# Patient Record
Sex: MALE | Race: WHITE | NOT HISPANIC OR LATINO | Employment: FULL TIME | URBAN - METROPOLITAN AREA
[De-identification: names, ages, dates, MRNs, and addresses within clinical notes are randomized per-mention and may not be internally consistent; named-entity substitution may affect disease eponyms.]

---

## 2022-10-05 ENCOUNTER — PATIENT MESSAGE (OUTPATIENT)
Dept: FAMILY MEDICINE | Facility: CLINIC | Age: 27
End: 2022-10-05
Payer: COMMERCIAL

## 2022-10-06 ENCOUNTER — TELEPHONE (OUTPATIENT)
Dept: FAMILY MEDICINE | Facility: CLINIC | Age: 27
End: 2022-10-06
Payer: COMMERCIAL

## 2022-10-06 NOTE — TELEPHONE ENCOUNTER
----- Message from Fred Rivera sent at 10/6/2022  8:43 AM CDT -----  Contact: pt at [phone]  Type:  Same Day Appointment Request    Caller is requesting a same day appointment.  Caller declined first available appointment listed below.      Name of Caller:  pt  When is the first available appointment?  10/7/22  Symptoms: abdominal pain  Best Call Back Number: [phone]    Additional Information:   Please call back and advise.

## 2022-10-07 ENCOUNTER — HOSPITAL ENCOUNTER (OUTPATIENT)
Dept: RADIOLOGY | Facility: HOSPITAL | Age: 27
Discharge: HOME | End: 2022-10-07
Attending: STUDENT IN AN ORGANIZED HEALTH CARE EDUCATION/TRAINING PROGRAM
Payer: COMMERCIAL

## 2022-10-07 ENCOUNTER — TELEPHONE (OUTPATIENT)
Dept: FAMILY MEDICINE | Facility: CLINIC | Age: 27
End: 2022-10-07
Payer: COMMERCIAL

## 2022-10-07 ENCOUNTER — OFFICE VISIT (OUTPATIENT)
Dept: FAMILY MEDICINE | Facility: CLINIC | Age: 27
End: 2022-10-07
Payer: COMMERCIAL

## 2022-10-07 VITALS
HEART RATE: 60 BPM | WEIGHT: 176.06 LBS | SYSTOLIC BLOOD PRESSURE: 112 MMHG | DIASTOLIC BLOOD PRESSURE: 80 MMHG | BODY MASS INDEX: 26.68 KG/M2 | HEIGHT: 68 IN

## 2022-10-07 DIAGNOSIS — R10.11 CONTINUOUS RUQ ABDOMINAL PAIN: ICD-10-CM

## 2022-10-07 DIAGNOSIS — R10.11 CONTINUOUS RUQ ABDOMINAL PAIN: Primary | ICD-10-CM

## 2022-10-07 DIAGNOSIS — Z11.4 ENCOUNTER FOR SCREENING FOR HUMAN IMMUNODEFICIENCY VIRUS (HIV): ICD-10-CM

## 2022-10-07 DIAGNOSIS — Z13.220 ENCOUNTER FOR SCREENING FOR LIPID DISORDER: ICD-10-CM

## 2022-10-07 DIAGNOSIS — Z11.59 ENCOUNTER FOR HEPATITIS C SCREENING TEST FOR LOW RISK PATIENT: ICD-10-CM

## 2022-10-07 LAB
BILIRUB SERPL-MCNC: NORMAL MG/DL
BLOOD URINE, POC: NORMAL
CLARITY, POC UA: CLEAR
COLOR, POC UA: YELLOW
GLUCOSE UR QL STRIP: NORMAL
KETONES UR QL STRIP: NORMAL
LEUKOCYTE ESTERASE URINE, POC: NORMAL
NITRITE, POC UA: NORMAL
PH, POC UA: 7.5
PROTEIN, POC: NORMAL
SPECIFIC GRAVITY, POC UA: 1.01
UROBILINOGEN, POC UA: 0.2

## 2022-10-07 PROCEDURE — 3079F DIAST BP 80-89 MM HG: CPT | Mod: CPTII,S$GLB,, | Performed by: STUDENT IN AN ORGANIZED HEALTH CARE EDUCATION/TRAINING PROGRAM

## 2022-10-07 PROCEDURE — 71100 XR RIBS 2 VIEW RIGHT: ICD-10-PCS | Mod: 26,RT,, | Performed by: RADIOLOGY

## 2022-10-07 PROCEDURE — 99214 PR OFFICE/OUTPT VISIT, EST, LEVL IV, 30-39 MIN: ICD-10-PCS | Mod: S$GLB,,, | Performed by: STUDENT IN AN ORGANIZED HEALTH CARE EDUCATION/TRAINING PROGRAM

## 2022-10-07 PROCEDURE — 1160F RVW MEDS BY RX/DR IN RCRD: CPT | Mod: CPTII,S$GLB,, | Performed by: STUDENT IN AN ORGANIZED HEALTH CARE EDUCATION/TRAINING PROGRAM

## 2022-10-07 PROCEDURE — 1159F PR MEDICATION LIST DOCUMENTED IN MEDICAL RECORD: ICD-10-PCS | Mod: CPTII,S$GLB,, | Performed by: STUDENT IN AN ORGANIZED HEALTH CARE EDUCATION/TRAINING PROGRAM

## 2022-10-07 PROCEDURE — 99999 PR PBB SHADOW E&M-EST. PATIENT-LVL III: CPT | Mod: PBBFAC,,, | Performed by: STUDENT IN AN ORGANIZED HEALTH CARE EDUCATION/TRAINING PROGRAM

## 2022-10-07 PROCEDURE — 81002 POCT URINE DIPSTICK WITHOUT MICROSCOPE: ICD-10-PCS | Mod: S$GLB,,, | Performed by: STUDENT IN AN ORGANIZED HEALTH CARE EDUCATION/TRAINING PROGRAM

## 2022-10-07 PROCEDURE — 1160F PR REVIEW ALL MEDS BY PRESCRIBER/CLIN PHARMACIST DOCUMENTED: ICD-10-PCS | Mod: CPTII,S$GLB,, | Performed by: STUDENT IN AN ORGANIZED HEALTH CARE EDUCATION/TRAINING PROGRAM

## 2022-10-07 PROCEDURE — 1159F MED LIST DOCD IN RCRD: CPT | Mod: CPTII,S$GLB,, | Performed by: STUDENT IN AN ORGANIZED HEALTH CARE EDUCATION/TRAINING PROGRAM

## 2022-10-07 PROCEDURE — 3074F PR MOST RECENT SYSTOLIC BLOOD PRESSURE < 130 MM HG: ICD-10-PCS | Mod: CPTII,S$GLB,, | Performed by: STUDENT IN AN ORGANIZED HEALTH CARE EDUCATION/TRAINING PROGRAM

## 2022-10-07 PROCEDURE — 3008F BODY MASS INDEX DOCD: CPT | Mod: CPTII,S$GLB,, | Performed by: STUDENT IN AN ORGANIZED HEALTH CARE EDUCATION/TRAINING PROGRAM

## 2022-10-07 PROCEDURE — 99214 OFFICE O/P EST MOD 30 MIN: CPT | Mod: S$GLB,,, | Performed by: STUDENT IN AN ORGANIZED HEALTH CARE EDUCATION/TRAINING PROGRAM

## 2022-10-07 PROCEDURE — 3074F SYST BP LT 130 MM HG: CPT | Mod: CPTII,S$GLB,, | Performed by: STUDENT IN AN ORGANIZED HEALTH CARE EDUCATION/TRAINING PROGRAM

## 2022-10-07 PROCEDURE — 81002 URINALYSIS NONAUTO W/O SCOPE: CPT | Mod: S$GLB,,, | Performed by: STUDENT IN AN ORGANIZED HEALTH CARE EDUCATION/TRAINING PROGRAM

## 2022-10-07 PROCEDURE — 3079F PR MOST RECENT DIASTOLIC BLOOD PRESSURE 80-89 MM HG: ICD-10-PCS | Mod: CPTII,S$GLB,, | Performed by: STUDENT IN AN ORGANIZED HEALTH CARE EDUCATION/TRAINING PROGRAM

## 2022-10-07 PROCEDURE — 71100 X-RAY EXAM RIBS UNI 2 VIEWS: CPT | Mod: TC,PN,RT

## 2022-10-07 PROCEDURE — 3008F PR BODY MASS INDEX (BMI) DOCUMENTED: ICD-10-PCS | Mod: CPTII,S$GLB,, | Performed by: STUDENT IN AN ORGANIZED HEALTH CARE EDUCATION/TRAINING PROGRAM

## 2022-10-07 PROCEDURE — 99999 PR PBB SHADOW E&M-EST. PATIENT-LVL III: ICD-10-PCS | Mod: PBBFAC,,, | Performed by: STUDENT IN AN ORGANIZED HEALTH CARE EDUCATION/TRAINING PROGRAM

## 2022-10-07 PROCEDURE — 71100 X-RAY EXAM RIBS UNI 2 VIEWS: CPT | Mod: 26,RT,, | Performed by: RADIOLOGY

## 2022-10-07 RX ORDER — MELOXICAM 15 MG/1
15 TABLET ORAL DAILY
Qty: 14 TABLET | Refills: 0 | Status: SHIPPED | OUTPATIENT
Start: 2022-10-07 | End: 2022-10-21

## 2022-10-07 NOTE — TELEPHONE ENCOUNTER
Spoke with pt. States medication was able to be picked up.     Follow up appointment scheduled.

## 2022-10-07 NOTE — PROGRESS NOTES
Subjective:      Patient ID: Thaddeus Skaggs is a 27 y.o. male    No chief complaint on file.    HPI  27 y.o. male with a PMHx as documented below presents to clinic today for the following:  - RUQ abdominal pain: Return appointment for persistent RUQ abdominal pain.  Patient confirms approximate 1 week history of RUQ abdominal pain.  Patient reports pain is constant and frequently wakes him from sleep around 3-4 AM.  Pain is worse in the morning and slowly improves throughout the day, although still present.  Relatively unchanged with food.  Patient reports discomfort somewhat intensified by stretching.  Patient states that hot baths help the pain.  He is not taking any medications other than ibuprofen occasionally for the symptoms.  Patient reports slight improvement with ibuprofen.  Patient reports mild CVA tenderness.  No associated fever, chills, chest pain, shortness a breath, nausea, vomiting, constipation, diarrhea, urinary symptoms.  Patient reports regular bowel movements.  Last bowel movement this morning.  Reviewed diet, no new foods or dietary changes.  Patient is a cross fit  and is consistent with healthy diet.  Patient takes daily supplements including zinc (50 mg daily), magnesium, vitamin-D, protein powder, and pre-workout.  No reported accidents, injuries, abnormal lifting, or falls with weights (related to cross fit).  No reported alcohol use.  No recreational drug use.    Reviewed ultrasound and labs ordered at last visit.  Only pertinent findings include slight elevation in ALT.  Otherwise, workup unremarkable.    Of note, patient with relatively high pain tolerance, does not routinely go to doctors.  Pain significantly bothering him, enough for him to seek medical treatment.     Review of Systems   Constitutional:  Negative for chills and fever.   Respiratory:  Negative for shortness of breath.    Cardiovascular:  Negative for chest pain.   Gastrointestinal:  Positive for abdominal pain.  Negative for constipation, diarrhea, heartburn, nausea and vomiting.   Genitourinary:  Negative for dysuria, frequency, hematuria and urgency.   Musculoskeletal:  Positive for back pain (very mild, right side). Negative for neck pain.   Skin:  Negative for rash.   Neurological:  Negative for dizziness, sensory change, weakness and headaches.   Psychiatric/Behavioral:  Negative for depression. The patient is not nervous/anxious.      History reviewed. No pertinent past medical history.    History reviewed. No pertinent surgical history.    Review of patient's allergies indicates:  No Known Allergies    History reviewed. No pertinent family history.    Social History     Tobacco Use    Smoking status: Never    Smokeless tobacco: Never     Currently on File with [facility]:   There is no immunization history on file for this patient.    Objective:      Vitals:    10/07/22 1308   BP: 112/80   Pulse: 60     Physical Exam  Vitals reviewed.   Constitutional:       General: He is not in acute distress.     Appearance: Normal appearance. He is not ill-appearing or diaphoretic.   HENT:      Head: Normocephalic and atraumatic.   Cardiovascular:      Rate and Rhythm: Normal rate.   Pulmonary:      Effort: Pulmonary effort is normal. No respiratory distress.   Abdominal:      General: Abdomen is flat. There is no distension.      Palpations: Abdomen is soft. There is no mass.      Tenderness: There is abdominal tenderness in the right upper quadrant. There is no right CVA tenderness, left CVA tenderness, guarding or rebound.   Neurological:      General: No focal deficit present.      Mental Status: He is alert and oriented to person, place, and time. Mental status is at baseline.      Assessment:       1. Continuous RUQ abdominal pain    2. Encounter for hepatitis C screening test for low risk patient    3. Encounter for screening for human immunodeficiency virus (HIV)    4. Encounter for screening for lipid disorder       Plan:       Diagnoses and all orders for this visit:    Continuous RUQ abdominal pain  -     Cancel: Urinalysis; Future  -     Hepatitis Panel, Acute; Future  -     IRON AND TIBC; Future  -     Comprehensive Metabolic Panel; Future  -     TSH; Future  -     Vitamin D; Future  -     Magnesium; Future  -     Phosphorus; Future  -     ZINC; Future  -     HIV 1/2 Ag/Ab (4th Gen); Future  -     Lipid Panel; Future  -     POCT URINE DIPSTICK WITHOUT MICROSCOPE  -     X-Ray Ribs 2 View Right; Future  -     FERRITIN; Future  -     Cancel: POCT URINE DIPSTICK WITHOUT MICROSCOPE  -     meloxicam (MOBIC) 15 MG tablet; Take 1 tablet (15 mg total) by mouth once daily. for 14 days    Encounter for hepatitis C screening test for low risk patient  -     Hepatitis Panel, Acute; Future    Encounter for screening for human immunodeficiency virus (HIV)  -     HIV 1/2 Ag/Ab (4th Gen); Future    Encounter for screening for lipid disorder  -     Lipid Panel; Future     Follow up in about 1 week (around 10/14/2022), or if symptoms worsen or fail to improve.    Skylar Ramirez MD  [facility]  Office: [phone]   Fax: [phone]  10/07/2022    Disclaimer: This note was partly generated using dictation software which may occasionally result in transcription errors.

## 2022-10-07 NOTE — TELEPHONE ENCOUNTER
----- Message from Fred Rivera sent at 10/7/2022  2:58 PM CDT -----  Contact: pt at [phone]  Type: Needs Medical Advice  Who Called:  pt    Best Call Back Number: [phone]    Additional Information: pt is calling the office stating the pharmacy is saying they have not received a script for him. Please call back and advise.

## 2022-10-10 ENCOUNTER — OFFICE VISIT (OUTPATIENT)
Dept: FAMILY MEDICINE | Facility: CLINIC | Age: 27
End: 2022-10-10
Payer: COMMERCIAL

## 2022-10-10 ENCOUNTER — PATIENT MESSAGE (OUTPATIENT)
Dept: FAMILY MEDICINE | Facility: CLINIC | Age: 27
End: 2022-10-10

## 2022-10-10 DIAGNOSIS — D50.9 IRON DEFICIENCY ANEMIA, UNSPECIFIED IRON DEFICIENCY ANEMIA TYPE: ICD-10-CM

## 2022-10-10 DIAGNOSIS — R74.01 ELEVATED ALT MEASUREMENT: ICD-10-CM

## 2022-10-10 DIAGNOSIS — R10.11 CONTINUOUS RUQ ABDOMINAL PAIN: Primary | ICD-10-CM

## 2022-10-10 PROCEDURE — 1160F RVW MEDS BY RX/DR IN RCRD: CPT | Mod: CPTII,95,, | Performed by: STUDENT IN AN ORGANIZED HEALTH CARE EDUCATION/TRAINING PROGRAM

## 2022-10-10 PROCEDURE — 1159F PR MEDICATION LIST DOCUMENTED IN MEDICAL RECORD: ICD-10-PCS | Mod: CPTII,95,, | Performed by: STUDENT IN AN ORGANIZED HEALTH CARE EDUCATION/TRAINING PROGRAM

## 2022-10-10 PROCEDURE — 99213 OFFICE O/P EST LOW 20 MIN: CPT | Mod: 95,,, | Performed by: STUDENT IN AN ORGANIZED HEALTH CARE EDUCATION/TRAINING PROGRAM

## 2022-10-10 PROCEDURE — 1159F MED LIST DOCD IN RCRD: CPT | Mod: CPTII,95,, | Performed by: STUDENT IN AN ORGANIZED HEALTH CARE EDUCATION/TRAINING PROGRAM

## 2022-10-10 PROCEDURE — 99213 PR OFFICE/OUTPT VISIT, EST, LEVL III, 20-29 MIN: ICD-10-PCS | Mod: 95,,, | Performed by: STUDENT IN AN ORGANIZED HEALTH CARE EDUCATION/TRAINING PROGRAM

## 2022-10-10 PROCEDURE — 1160F PR REVIEW ALL MEDS BY PRESCRIBER/CLIN PHARMACIST DOCUMENTED: ICD-10-PCS | Mod: CPTII,95,, | Performed by: STUDENT IN AN ORGANIZED HEALTH CARE EDUCATION/TRAINING PROGRAM

## 2022-10-10 NOTE — PROGRESS NOTES
Audiovisual Telehealth Visit     The patient location is: Home   The chief complaint leading to consultation is: Abdominal pain, follow-up  Visit type: Virtual visit with audiovisual  Total time spent with patient: 10     Each patient to whom I provide medical services by telemedicine is: (1) informed of the relationship between the physician and patient and the respective role of any other health care provider with respect to management of the patient; and (2) notified that they may decline to receive medical services by telemedicine and may withdraw from such care at any time. Patient verbally consented to receive this service via voice-only telephone call.    Patient ID: Thaddeus Skaggs is a 27 y.o. male     HPI: 27 y.o. male with no significant PMHx presents to clinic today (via audiovisual telehealth visit) for the following:  - Reviewed lab test results from last week for further workup of RUQ abdominal pain. Labs showed iron deficiency anemia but otherwise results unremarkable. Pt reports mobic prescribed at last appointment helped somewhat but overall not that effective. Pt states pain has improved slight, now rated 6/10 in severity. No new symptoms, no other significant changes in symptoms.    Answers submitted by the patient for this visit:  Abdominal Pain Questionnaire (Submitted on 10/10/2022)  Chief Complaint: Abdominal pain  Chronicity: new  Onset: in the past 7 days  Onset quality: sudden  Frequency: constantly  Episode duration: 24 Hours  Progression since onset: unchanged  Pain location: RUQ  Pain - numeric: 7/10  Pain quality: sharp  Radiates to: RUQ  anorexia: No  arthralgias: No  belching: No  constipation: No  diarrhea: No  dysuria: No  fever: No  flatus: No  frequency: No  headaches: No  hematochezia: No  hematuria: No  melena: No  myalgias: No  nausea: No  weight loss: No  vomiting: No  Aggravated by: deep breathing, movement  Relieved by: being still  Diagnostic workup: ultrasound  Pain  severity: moderate  Treatments tried: nothing  abdominal surgery: No  colon cancer: No  Crohn's disease: No  gallstones: No  GERD: No  irritable bowel syndrome: No  kidney stones: No  pancreatitis: No  PUD: No  ulcerative colitis: No  UTI: No    Physical Exam:     There were no vitals filed for this visit.    Physical Exam  Vitals reviewed.   Constitutional:       General: He is not in acute distress.  HENT:      Head: Normocephalic and atraumatic.   Cardiovascular:      Rate and Rhythm: Normal rate.   Pulmonary:      Effort: Pulmonary effort is normal. No respiratory distress.   Neurological:      General: No focal deficit present.      Mental Status: He is alert and oriented to person, place, and time. Mental status is at baseline.      Assessment and Plan:    Thaddeus was seen today for abdominal pain.    Diagnoses and all orders for this visit:    Continuous RUQ abdominal pain  -     CT Abdomen With Without Contrast; Future  -     Ambulatory referral/consult to Gastroenterology; Future    Elevated ALT measurement  -     CT Abdomen With Without Contrast; Future  -     Ambulatory referral/consult to Gastroenterology; Future    Iron deficiency anemia, unspecified iron deficiency anemia type  -     Ambulatory referral/consult to Gastroenterology; Future    Will call patient w/ results of CT scan, follow-up pending results.    Skylar Ramirez MD  [facility]  Office: [phone]   Fax: [phone]  10/10/2022         Disclaimer: This note was partly generated using dictation software which may occasionally result in transcription errors.       This service was not originating from a related E/M service provided within the previous 7 days nor will  to an E/M service or procedure within the next 24 hours or my soonest available appointment. Prevailing standard of care was able to be met in this audio-only visit.

## 2022-10-11 ENCOUNTER — PATIENT MESSAGE (OUTPATIENT)
Dept: FAMILY MEDICINE | Facility: CLINIC | Age: 27
End: 2022-10-11
Payer: COMMERCIAL

## 2022-10-12 DIAGNOSIS — R74.01 ELEVATED ALT MEASUREMENT: ICD-10-CM

## 2022-10-12 DIAGNOSIS — R10.11 CONTINUOUS RUQ ABDOMINAL PAIN: Primary | ICD-10-CM

## 2022-10-12 RX ORDER — TRAMADOL HYDROCHLORIDE 50 MG/1
50 TABLET ORAL EVERY 8 HOURS PRN
Qty: 10 TABLET | Refills: 0 | Status: SHIPPED | OUTPATIENT
Start: 2022-10-12

## 2022-10-14 ENCOUNTER — HOSPITAL ENCOUNTER (OUTPATIENT)
Dept: RADIOLOGY | Facility: HOSPITAL | Age: 27
Discharge: HOME | End: 2022-10-14
Attending: STUDENT IN AN ORGANIZED HEALTH CARE EDUCATION/TRAINING PROGRAM
Payer: COMMERCIAL

## 2022-10-14 DIAGNOSIS — R74.01 ELEVATED ALT MEASUREMENT: ICD-10-CM

## 2022-10-14 DIAGNOSIS — R10.11 CONTINUOUS RUQ ABDOMINAL PAIN: ICD-10-CM

## 2022-10-14 PROCEDURE — 74178 CT ABD&PLV WO CNTR FLWD CNTR: CPT | Mod: 26,,, | Performed by: RADIOLOGY

## 2022-10-14 PROCEDURE — 25500020 PHARM REV CODE 255: Mod: PO | Performed by: STUDENT IN AN ORGANIZED HEALTH CARE EDUCATION/TRAINING PROGRAM

## 2022-10-14 PROCEDURE — A9698 NON-RAD CONTRAST MATERIALNOC: HCPCS | Mod: PO | Performed by: STUDENT IN AN ORGANIZED HEALTH CARE EDUCATION/TRAINING PROGRAM

## 2022-10-14 PROCEDURE — 74178 CT ABD&PLV WO CNTR FLWD CNTR: CPT | Mod: TC,PO

## 2022-10-14 PROCEDURE — 74178 CT ABDOMEN PELVIS W WO CONTRAST: ICD-10-PCS | Mod: 26,,, | Performed by: RADIOLOGY

## 2022-10-14 RX ADMIN — IOHEXOL 75 ML: 350 INJECTION, SOLUTION INTRAVENOUS at 01:10

## 2022-10-14 RX ADMIN — BARIUM SULFATE 900 ML: 20 SUSPENSION ORAL at 01:10

## 2022-10-17 ENCOUNTER — LAB VISIT (OUTPATIENT)
Dept: LAB | Facility: HOSPITAL | Age: 27
End: 2022-10-17
Payer: COMMERCIAL

## 2022-10-17 ENCOUNTER — OFFICE VISIT (OUTPATIENT)
Dept: GASTROENTEROLOGY | Facility: CLINIC | Age: 27
End: 2022-10-17
Payer: COMMERCIAL

## 2022-10-17 VITALS — WEIGHT: 176.13 LBS | HEIGHT: 68 IN | BODY MASS INDEX: 26.69 KG/M2

## 2022-10-17 DIAGNOSIS — D50.9 IRON DEFICIENCY ANEMIA, UNSPECIFIED IRON DEFICIENCY ANEMIA TYPE: ICD-10-CM

## 2022-10-17 DIAGNOSIS — R74.01 ELEVATED ALT MEASUREMENT: ICD-10-CM

## 2022-10-17 DIAGNOSIS — R10.11 RUQ PAIN: Primary | ICD-10-CM

## 2022-10-17 DIAGNOSIS — R10.11 RUQ PAIN: ICD-10-CM

## 2022-10-17 DIAGNOSIS — K76.9 LIVER LESION: ICD-10-CM

## 2022-10-17 LAB
ALBUMIN SERPL BCP-MCNC: 4.9 G/DL (ref 3.5–5.2)
ALP SERPL-CCNC: 68 U/L (ref 55–135)
ALT SERPL W/O P-5'-P-CCNC: 42 U/L (ref 10–44)
AST SERPL-CCNC: 25 U/L (ref 10–40)
BILIRUB DIRECT SERPL-MCNC: 0.2 MG/DL (ref 0.1–0.3)
BILIRUB SERPL-MCNC: 0.5 MG/DL (ref 0.1–1)
ERYTHROCYTE [DISTWIDTH] IN BLOOD BY AUTOMATED COUNT: 13.7 % (ref 11.5–14.5)
FERRITIN SERPL-MCNC: 370 NG/ML (ref 20–300)
HCT VFR BLD AUTO: 44.2 % (ref 40–54)
HGB BLD-MCNC: 14 G/DL (ref 14–18)
IRON SERPL-MCNC: 92 UG/DL (ref 45–160)
LIPASE SERPL-CCNC: 18 U/L (ref 4–60)
MCH RBC QN AUTO: 25.2 PG (ref 27–31)
MCHC RBC AUTO-ENTMCNC: 31.7 G/DL (ref 32–36)
MCV RBC AUTO: 80 FL (ref 82–98)
PLATELET # BLD AUTO: 261 K/UL (ref 150–450)
PMV BLD AUTO: 11.7 FL (ref 9.2–12.9)
PROT SERPL-MCNC: 7.7 G/DL (ref 6–8.4)
RBC # BLD AUTO: 5.56 M/UL (ref 4.6–6.2)
SATURATED IRON: 25 % (ref 20–50)
TOTAL IRON BINDING CAPACITY: 366 UG/DL (ref 250–450)
TRANSFERRIN SERPL-MCNC: 247 MG/DL (ref 200–375)
WBC # BLD AUTO: 5.73 K/UL (ref 3.9–12.7)

## 2022-10-17 PROCEDURE — 36415 COLL VENOUS BLD VENIPUNCTURE: CPT | Mod: PO | Performed by: NURSE PRACTITIONER

## 2022-10-17 PROCEDURE — 86677 HELICOBACTER PYLORI ANTIBODY: CPT | Performed by: NURSE PRACTITIONER

## 2022-10-17 PROCEDURE — 1160F RVW MEDS BY RX/DR IN RCRD: CPT | Mod: CPTII,S$GLB,, | Performed by: NURSE PRACTITIONER

## 2022-10-17 PROCEDURE — 84466 ASSAY OF TRANSFERRIN: CPT | Performed by: NURSE PRACTITIONER

## 2022-10-17 PROCEDURE — 1160F PR REVIEW ALL MEDS BY PRESCRIBER/CLIN PHARMACIST DOCUMENTED: ICD-10-PCS | Mod: CPTII,S$GLB,, | Performed by: NURSE PRACTITIONER

## 2022-10-17 PROCEDURE — 1159F MED LIST DOCD IN RCRD: CPT | Mod: CPTII,S$GLB,, | Performed by: NURSE PRACTITIONER

## 2022-10-17 PROCEDURE — 85027 COMPLETE CBC AUTOMATED: CPT | Performed by: NURSE PRACTITIONER

## 2022-10-17 PROCEDURE — 82728 ASSAY OF FERRITIN: CPT | Performed by: NURSE PRACTITIONER

## 2022-10-17 PROCEDURE — 99204 OFFICE O/P NEW MOD 45 MIN: CPT | Mod: S$GLB,,, | Performed by: NURSE PRACTITIONER

## 2022-10-17 PROCEDURE — 99204 PR OFFICE/OUTPT VISIT, NEW, LEVL IV, 45-59 MIN: ICD-10-PCS | Mod: S$GLB,,, | Performed by: NURSE PRACTITIONER

## 2022-10-17 PROCEDURE — 80076 HEPATIC FUNCTION PANEL: CPT | Performed by: NURSE PRACTITIONER

## 2022-10-17 PROCEDURE — 99999 PR PBB SHADOW E&M-EST. PATIENT-LVL III: ICD-10-PCS | Mod: PBBFAC,,, | Performed by: NURSE PRACTITIONER

## 2022-10-17 PROCEDURE — 83690 ASSAY OF LIPASE: CPT | Performed by: NURSE PRACTITIONER

## 2022-10-17 PROCEDURE — 1159F PR MEDICATION LIST DOCUMENTED IN MEDICAL RECORD: ICD-10-PCS | Mod: CPTII,S$GLB,, | Performed by: NURSE PRACTITIONER

## 2022-10-17 PROCEDURE — 99999 PR PBB SHADOW E&M-EST. PATIENT-LVL III: CPT | Mod: PBBFAC,,, | Performed by: NURSE PRACTITIONER

## 2022-10-17 NOTE — PATIENT INSTRUCTIONS
Abdominal Pain    Abdominal pain is pain in the stomach or belly area. Everyone has this pain from time to time. In many cases it goes away on its own. But abdominal pain can sometimes be due to a serious problem, such as appendicitis. So its important to know when to seek help.  Causes of abdominal pain  There are many possible causes of abdominal pain. Common causes in adults include:  Constipation, diarrhea, or gas  Stomach acid flowing back up into the esophagus (acid reflux or heartburn)  Severe acid reflux, called GERD (gastroesophageal reflux disease)  A sore in the lining of the stomach or small intestine (peptic ulcer)  Inflammation of the gallbladder, liver, or pancreas  Gallstones or kidney stones  Appendicitis   Intestinal blockage   An internal organ pushing through a muscle or other tissue (hernia)  Urinary tract infections  In women, menstrual cramps, fibroids, or endometriosis  Inflammation or infection of the intestines  Diagnosing the cause of abdominal pain  Your healthcare provider will do a physical exam help find the cause of your pain. If needed, tests will be ordered. Belly pain has many possible causes. So it can be hard to find the reason for your pain. Giving details about your pain can help. Tell your provider where and when you feel the pain, and what makes it better or worse. Also let your provider know if you have other symptoms such as:  Fever  Tiredness  Upset stomach (nausea)  Vomiting  Changes in bathroom habits  Treating abdominal pain  Some causes of pain need emergency medical treatment right away. These include appendicitis or a bowel blockage. Other problems can be treated with rest, fluids, or medicines. Your healthcare provider can give you specific instructions for treatment or self-care based on what is causing your pain.  If you have vomiting or diarrhea, sip water or other clear fluids. When you are ready to eat solid foods again, start with small amounts of  easy-to-digest, low-fat foods. These include apple sauce, toast, or crackers.   When to seek medical care  Call 911 or go to the hospital right away if you:  Cant pass stool and are vomiting  Are vomiting blood or have bloody diarrhea or black, tarry diarrhea  Have chest, neck, or shoulder pain  Feel like you might pass out  Have pain in your shoulder blades with nausea  Have sudden, severe belly pain  Have new, severe pain unlike any you have felt before  Have a belly that is rigid, hard, and tender to touch  Call your healthcare provider if you have:  Pain for more than 5 days  Bloating for more than 2 days  Diarrhea for more than 5 days  A fever of 100.4°F (38.0°C) or higher, or as directed by your provider  Pain that gets worse  Weight loss for no reason  Continued lack of appetite  Blood in your stool  How to prevent abdominal pain  Here are some tips to help prevent abdominal pain:  Eat smaller amounts of food at one time.  Avoid greasy, fried, or other high-fat foods.  Avoid foods that give you gas.  Exercise regularly.  Drink plenty of fluids.  To help prevent GERD symptoms:  Quit smoking.  Reduce alcohol and certain foods that increase stomach acid.  Avoid aspirin and over-the-counter pain and fever medicines (NSAIDS or nonsteroidal anti-inflammatory drugs), if possible  Lose extra weight.  Finish eating at least 2 hours before you go to bed or lie down.  Raise the head of your bed.  Date Last Reviewed: 7/1/2016  © 1527-4330 [employer]. [address] [place] [address], [place] [address]. All rights reserved. This information is not intended as a substitute for professional medical care. Always follow your healthcare professional's instructions.           Anemia  Anemia is a condition that occurs when your body does not have enough healthy red blood cells (RBCs). RBCs are the parts of your blood that carry oxygen throughout your body. A protein called hemoglobin allows your RBCs to absorb and release  oxygen. Without enough RBCs or hemoglobin, your body doesn't get enough oxygen. Symptoms of anemia may then occur.    What are the symptoms of anemia?  Some people with anemia have no symptoms. But most people have symptoms that range from mild to severe. These can include:  Tiredness (fatigue)  Weakness  Pale skin  Shortness of breath  Dizziness or fainting  Rapid heartbeat  Trouble doing normal amounts of activity  Jaundice (yellowing of your eyes, skin, or mouth; dark urine)  What causes anemia?  Anemia can occur when your body:  Loses too much blood  Does not make enough RBCs  Destroys your RBCs at a faster rate than it can replace them  Does not make a normal amount of hemoglobin in your RBCs  These problems can occur for many reasons, including:  A condition that you are born with (congenital or inherited), such as sickle cell disease or thalassemia  Heavy bleeding for any reason, including injury, surgery, childbirth, or even heavy menstrual periods  Being low in certain nutrients, such as iron, folate, or vitamin B12, possibly from a poor diet or a condition like celiac disease or Crohn's disease  Certain chronic conditions like diabetes, arthritis, or kidney disease  Certain chronic infections like tuberculosis or HIV  Exposure to certain medicines, such as those used for chemotherapy  There are different types of anemia. Your healthcare provider can tell you more about the type of anemia you have and what may have caused it.  How is anemia diagnosed?  To diagnose anemia, your healthcare provider orders blood tests. These can include:  Complete blood cell count (CBC). This test measures the amounts of the different types of blood cells.  Blood smear. This test checks the size and shape of your blood cells. To do the test, a drop of your blood is viewed under a microscope. A stain is used to make the blood cells easier to see.  Iron studies. These tests measure the amount of iron in your blood. Your body  needs iron to make hemoglobin in your RBCs.  Vitamin B12 and folate studies. These tests check for some of the components that help give RBCs a normal size and shape.  Reticulocyte count. This test measures the amount of new RBCs that your bone marrow makes.  Hemoglobin electrophoresis. This test checks for problems with your hemoglobin in RBCs.  How is anemia treated?  Treatment for anemia is based on the type of anemia, its cause, and the severity of your symptoms. Treatments may include:  Diet changes. This involves increasing the amount of certain nutrients in your diet, such as iron, vitamin B12, or folate. Your healthcare provider may also prescribe nutrient supplements.  Medicines. Certain medicines treat the cause of your anemia. Others help build new RBCs or relieve symptoms. If a medicine is the cause of your anemia, you may need to stop or change it.  Blood transfusions. Replacing some of your blood can increase the number of healthy RBCs in your body.  Surgery. In some cases, your doctor may do surgery to treat the underlying cause of anemia. If you need surgery, your healthcare provider will explain the procedure and outline the risks and benefits for you.  What are the long-term concerns?  If you have a certain type of anemia, you can expect a full recovery after treatment. If you have other types of anemia (especially a type you're born with), you will need to manage it for life. Your doctor can tell you more.  Date Last Reviewed: 12/1/2016  © 2894-6468 [employer]. [address] [place] [address]. All rights reserved. This information is not intended as a substitute for professional medical care. Always follow your healthcare professional's instructions.

## 2022-10-17 NOTE — PROGRESS NOTES
Subjective:       Patient ID: Thaddeus Skaggs is a 27 y.o. male Body mass index is 26.78 kg/m².    Chief Complaint: Abdominal Pain (Started two weeks ago, started on Right side. Does not radiate. Sharp pain noted. )    This patient is new to me.  Referring Provider: Dr. Skylar Ramirez for continuous RUQ pain, elevated ALT, & iron deficiency anemia.     Abdominal Pain  This is a new problem. The current episode started 1 to 4 weeks ago (started ~10/4/2022, has been seeing PCP for it). The problem occurs daily. The problem has been rapidly improving. The pain is located in the RUQ. The pain is at a severity of 0/10 (currently). Quality: when it first started it was intense; has improved and now described as mild pressure currently only when he breaths in deeply. The abdominal pain does not radiate. Associated symptoms include belching (not more than usual) and flatus (not more than usual). Pertinent negatives include no constipation, diarrhea, dysuria, fever, frequency, hematochezia, melena, nausea, vomiting or weight loss. The pain is aggravated by deep breathing. Relieved by: laying in a warm bath. Treatments tried: mobic & ultram prescribed by PCP- patient is not taking these currently. Prior diagnostic workup includes ultrasound and CT scan. There is no history of GERD.     Review of Systems   Constitutional:  Negative for appetite change, chills, fatigue, fever and weight loss.        Works out a lot but denies dong anything out of the ordinary for him recently; Denies NSAID use; denies any recent trauma, injury, or fall   HENT:  Negative for sore throat and trouble swallowing.    Respiratory:  Negative for cough, choking and shortness of breath.    Cardiovascular:  Negative for chest pain.   Gastrointestinal:  Positive for abdominal pain and flatus (not more than usual). Negative for anal bleeding, blood in stool, constipation, diarrhea, hematochezia, melena, nausea, rectal pain and vomiting.   Genitourinary:   Negative for difficulty urinating, dysuria, flank pain and frequency.   Neurological:  Negative for weakness.       History reviewed. No pertinent past medical history.  History reviewed. No pertinent surgical history.  Family History   Problem Relation Age of Onset    Crohn's disease Maternal Uncle     Colon cancer Neg Hx     Esophageal cancer Neg Hx     Stomach cancer Neg Hx     Ulcerative colitis Neg Hx     Celiac disease Neg Hx     Liver cancer Neg Hx     Liver disease Neg Hx      Social History     Tobacco Use    Smoking status: Never    Smokeless tobacco: Never   Substance Use Topics    Alcohol use: Yes     Comment: 1-2 beers once every few months    Drug use: Never     Wt Readings from Last 10 Encounters:   10/17/22 79.9 kg (176 lb 2.4 oz)   10/07/22 79.9 kg (176 lb 0.6 oz)   10/04/22 80.3 kg (177 lb 2.2 oz)     Lab Results   Component Value Date    WBC 10.92 10/04/2022    HGB 13.6 (L) 10/04/2022    HCT 41.5 10/04/2022    MCV 78 (L) 10/04/2022     10/04/2022     Lab Results   Component Value Date    IRON 41 (L) 10/07/2022    TRANSFERRIN 229 10/07/2022    TIBC 339 10/07/2022    FESATURATED 12 (L) 10/07/2022      Lab Results   Component Value Date    FERRITIN 352 (H) 10/07/2022     CMP  Sodium   Date Value Ref Range Status   10/07/2022 139 136 - 145 mmol/L Final     Potassium   Date Value Ref Range Status   10/07/2022 4.2 3.5 - 5.1 mmol/L Final     Chloride   Date Value Ref Range Status   10/07/2022 102 95 - 110 mmol/L Final     CO2   Date Value Ref Range Status   10/07/2022 26 23 - 29 mmol/L Final     Glucose   Date Value Ref Range Status   10/07/2022 94 70 - 110 mg/dL Final     BUN   Date Value Ref Range Status   10/07/2022 17 6 - 20 mg/dL Final     Creatinine   Date Value Ref Range Status   10/07/2022 1.1 0.5 - 1.4 mg/dL Final     Calcium   Date Value Ref Range Status   10/07/2022 9.9 8.7 - 10.5 mg/dL Final     Total Protein   Date Value Ref Range Status   10/07/2022 7.4 6.0 - 8.4 g/dL Final      Albumin   Date Value Ref Range Status   10/07/2022 4.5 3.5 - 5.2 g/dL Final     Total Bilirubin   Date Value Ref Range Status   10/07/2022 0.7 0.1 - 1.0 mg/dL Final     Comment:     For infants and newborns, interpretation of results should be based  on gestational age, weight and in agreement with clinical  observations.    Premature Infant recommended reference ranges:  Up to 24 hours.............<8.0 mg/dL  Up to 48 hours............<12.0 mg/dL  3-5 days..................<15.0 mg/dL  6-29 days.................<15.0 mg/dL       Alkaline Phosphatase   Date Value Ref Range Status   10/07/2022 71 55 - 135 U/L Final     AST   Date Value Ref Range Status   10/07/2022 20 10 - 40 U/L Final     ALT   Date Value Ref Range Status   10/07/2022 53 (H) 10 - 44 U/L Final     Anion Gap   Date Value Ref Range Status   10/07/2022 11 8 - 16 mmol/L Final     Lab Results   Component Value Date    TSH 2.147 10/07/2022     Lab Results   Component Value Date    HEPAIGM Non-reactive 10/07/2022    HEPBIGM Non-reactive 10/07/2022    HEPCAB Non-reactive 10/07/2022     Reviewed prior medical records including radiology report of 10/14/2022 CT abdomen pelvis; 10/7/2022 ribs x-ray; & 10/4/2022 abdominal ultrasound.    Objective:      Physical Exam  Vitals and nursing note reviewed.   Constitutional:       General: He is not in acute distress.     Appearance: Normal appearance. He is well-developed. He is not diaphoretic.   HENT:      Mouth/Throat:      Lips: Pink. No lesions.      Mouth: Mucous membranes are moist. No oral lesions.      Tongue: No lesions.      Pharynx: Oropharynx is clear. No pharyngeal swelling or posterior oropharyngeal erythema.   Eyes:      General: No scleral icterus.     Conjunctiva/sclera: Conjunctivae normal.   Pulmonary:      Effort: Pulmonary effort is normal. No respiratory distress.      Breath sounds: Normal breath sounds. No wheezing.   Abdominal:      General: Bowel sounds are normal. There is no distension  or abdominal bruit.      Palpations: Abdomen is soft. Abdomen is not rigid. There is no mass.      Tenderness: There is no abdominal tenderness. There is no guarding or rebound. Negative signs include Sumner's sign and McBurney's sign.   Skin:     General: Skin is warm and dry.      Coloration: Skin is not pale.      Findings: No erythema or rash.      Comments: Non-jaundiced   Neurological:      Mental Status: He is alert and oriented to person, place, and time.   Psychiatric:         Behavior: Behavior normal.         Thought Content: Thought content normal.         Judgment: Judgment normal.       Assessment:       1. RUQ pain    2. Elevated ALT measurement    3. Liver lesion    4. Iron deficiency anemia, unspecified iron deficiency anemia type          Plan:       RUQ pain  - discussed about scheduling an EGD, discussed procedure with patient, including risks and benefits, patient verbalized understanding but patient declined EGD at this time since his symptoms are improving; possible EGD pending results of testing and if symptoms persist/worsen  -     Hepatic Function Panel; Future; Expected date: 10/17/2022  -     Lipase; Future; Expected date: 10/17/2022  -     H. PYLORI ANTIBODY, IGG; Future; Expected date: 10/17/2022  -     CBC Without Differential; Future; Expected date: 10/17/2022  - avoid/minimize use of NSAIDs- since they can cause GI upset, bleeding and/or ulcers. If NSAID must be taken, recommend take with food.    Elevated ALT measurement & Liver lesion  - ALT level improved on most recent blood work  - minimize/avoid alcohol and tylenol products, & follow-up with PCP for continued evaluation and management; if specialist is needed, recommend seeing hepatology.  -     Hepatic Function Panel; Future; Expected date: 10/17/2022    Iron deficiency anemia, unspecified iron deficiency anemia type  - discussed with patient the different ways that anemia occurs: blood loss (such as from the gi tract), the  body is not making enough, or the body is breaking down the rbcs too quickly; recommend EGD and colonoscopy to further evaluate gi tract for possible blood loss and pending results of endoscopies, possible UGI with Small Bowel Follow Through/video capsule study  - discussed about scheduling an EGD & colonoscopy, discussed procedures with patient, including risks and benefits, patient verbalized understanding but patient declined EGD & colonoscopy at this time since his symptoms are improving and wants to complete labs to monitor anemia; possible EGD & colonoscopy pending results of testing and if symptoms persist/worsen  -follow-up with PCP and/or hematology for continued evaluation and management  -     CBC Without Differential; Future; Expected date: 10/17/2022  -     Iron and TIBC; Future; Expected date: 10/17/2022  -     Ferritin; Future; Expected date: 10/17/2022  -     Occult blood x 1, stool; Future; Expected date: 10/17/2022  -     Occult blood x 1, stool; Future; Expected date: 10/17/2022  -     Occult blood x 1, stool; Future; Expected date: 10/17/2022    Follow up in about 1 month (around 11/17/2022), or if symptoms worsen or fail to improve.      If no improvement in symptoms or symptoms worsen, call/follow-up at clinic or go to ER.        47 minutes of total time spent on the encounter, which includes face to face time and non-face to face time preparing to see the patient (e.g., review of tests), Obtaining and/or reviewing separately obtained history, Documenting clinical information in the electronic or other health record, Independently interpreting results (not separately reported) and communicating results to the patient/family/caregiver, or Care coordination (not separately reported).

## 2022-10-18 LAB — H PYLORI IGG SERPL QL IA: NORMAL

## 2022-10-24 ENCOUNTER — TELEPHONE (OUTPATIENT)
Dept: FAMILY MEDICINE | Facility: CLINIC | Age: 27
End: 2022-10-24
Payer: COMMERCIAL

## 2022-10-24 NOTE — TELEPHONE ENCOUNTER
----- Message from RT Loida sent at 10/21/2022  8:10 AM CDT -----  Good morning. The patient had a CT Abdomen/Pelvis w/wo contrast and the radiologist is recommending a MRI Liver w/wo contrast within 3-6 months if patient is high risk due to the finding of a liver mass. Could you please follow up with the patient and order and schedule if needed? Thank you.

## 2022-10-25 NOTE — TELEPHONE ENCOUNTER
Patient low risk, okay to defer further workup at this time.    Skylar Ramirez MD  [facility]  Office: [phone]   Fax: [phone]  10/25/2022

## 2024-07-09 ENCOUNTER — PATIENT MESSAGE (OUTPATIENT)
Dept: ADMINISTRATIVE | Facility: HOSPITAL | Age: 29
End: 2024-07-09
Payer: COMMERCIAL

## 2025-08-19 ENCOUNTER — PATIENT MESSAGE (OUTPATIENT)
Dept: ADMINISTRATIVE | Facility: HOSPITAL | Age: 30
End: 2025-08-19
Payer: COMMERCIAL